# Patient Record
Sex: MALE | Race: WHITE | URBAN - METROPOLITAN AREA
[De-identification: names, ages, dates, MRNs, and addresses within clinical notes are randomized per-mention and may not be internally consistent; named-entity substitution may affect disease eponyms.]

---

## 2017-02-01 ENCOUNTER — TELEPHONE (OUTPATIENT)
Dept: TRANSPLANT | Facility: CLINIC | Age: 52
End: 2017-02-01

## 2017-02-01 NOTE — TELEPHONE ENCOUNTER
"Living Kidney Donor Evaluation Completed: 2017 11:57:05 CT Updated: 2017 12:24:40 CT  Donor Name: Bowen Hamlin MRN: [id number] Note: : 1965 Age: 51Gender: Male Donor Height: 5  10\" Weight (lb): 170 BMI: 24.4  Donor Race:  Ethnicity: Not / Donor Preferred Language: English  Required?: No Current Marital Status: Single  Demographics: Home Address: [address] [place] [address] City: [place] State: [place] Zip: [address] Country: [place]  Best Phone: [phone] Alt Phone: Donor Email: Best Phone Type: Mobile Alt Phone Type:   Preferred Contact Time(s): Preferred Contact Day(s):   Donor Screen: PASSED Donor Referred by: [place] Candidate Donor self reported ABO: Unknown  Recipient Information: Recipient Name (Last, First): Carlos Wiseman Recipient :    1963  ... Donor Relationship: Full Sibling Recipient Diagnosis: Recipient ABO:   MEDICAL HISTORY:  Stress Tests, Normal   MEDICATIONS:  None Reported  SURGICAL HISTORY:  None Reported  ALLERGIES:  Had a childhood reaction to Ampicillin- possibly : other (Not sure was too young )  SOCIAL HISTORY:  EtOH: Occasional (1-2 drinks/week)  Illicit Drug Use: Denies  Tobacco: Current (1/2 ppd x 30 years)  SELF-REPORTED FUNCTIONAL STATUS:  \"I am able to participate in strenuous sports such as swimming, singles tennis, football, basketball, or skiing\"  Exercise (>3X per week)  REVIEW OF ORGAN SYSTEMS: Airway or Lungs: No Blood Disorder: No Cancer: No Diabetes,Thyroid,Adrenal,Endocrine Disorder: No Digestive or Liver: No Female Health: No Heart or Circulatory System: No Immune Diseases: No Kidneys and Bladder: No Muscles,Bones,Joints: No Neuro: No Psych: No  FAMILY HISTORY: Confirmed:  Cancer (Father)  Kidney Disease (Sibling)  Denied:  Diabetes (denies)  Heart Disease (denies)  Hypertension (denies)  Kidney Stones (denies)  DONOR INFORMATION:  Level of Education: High school or secondary school degree complete Employment " Status: Self Employed Medical Insurance Status: No medical insurance Current Accommodation: Lives in rented accommodation Living Arrangement: Alone Allow Disclosure to Recipient: Yes Paired Kidney Exchange Education Level: None Paired Kidney Exchange Participation Consent: Unsure Donor Motivation: Highly motivated donor  HIGH RISK BEHAVIOR:  Blood transfusion < 12 months. (NO)  Commercial sex < 12 months. (NO)  Illicit IV drug use < 5yrs. (NO)  Other high risk sexual contact < 12 months. (NO)  EMERGENCY CONTACT INFORMATION:  Primary Secondary First Name: Stella Last Name: Bowen Phone Number: [phone] Relationship: Mother  First Name: Arya  Last Name: Billpoole Phone Number: [phone] Relationship: Sibling  REASON FOR DONATION:   To assist brother   PHYSICIAN CONTACT INFORMATION:  PCP  Name:    City: State: [place]  Phone:   ADDITIONAL NOTES:   Applicant came through listed as female, but should be corrected to show male. Boubacar, is a male. Jennifer 2/1

## 2017-02-02 NOTE — TELEPHONE ENCOUNTER
Left message asking Boubacar to return call for screening. Unknown abo. Need to discuss PEP. Will now send all Phase 1's and donor pkt.

## 2017-02-03 ENCOUNTER — TELEPHONE (OUTPATIENT)
Dept: TRANSPLANT | Facility: CLINIC | Age: 52
End: 2017-02-03

## 2017-03-30 ENCOUNTER — DOCUMENTATION ONLY (OUTPATIENT)
Dept: TRANSPLANT | Facility: CLINIC | Age: 52
End: 2017-03-30